# Patient Record
Sex: FEMALE | Race: BLACK OR AFRICAN AMERICAN | URBAN - METROPOLITAN AREA
[De-identification: names, ages, dates, MRNs, and addresses within clinical notes are randomized per-mention and may not be internally consistent; named-entity substitution may affect disease eponyms.]

---

## 2017-10-03 ENCOUNTER — OFFICE VISIT (OUTPATIENT)
Dept: CARDIOLOGY CLINIC | Age: 82
End: 2017-10-03

## 2017-10-03 VITALS
HEIGHT: 65 IN | SYSTOLIC BLOOD PRESSURE: 142 MMHG | BODY MASS INDEX: 36.32 KG/M2 | WEIGHT: 218 LBS | DIASTOLIC BLOOD PRESSURE: 57 MMHG | HEART RATE: 51 BPM

## 2017-10-03 DIAGNOSIS — E78.00 PURE HYPERCHOLESTEROLEMIA: ICD-10-CM

## 2017-10-03 DIAGNOSIS — I48.0 PAROXYSMAL ATRIAL FIBRILLATION (HCC): ICD-10-CM

## 2017-10-03 DIAGNOSIS — I77.810 AORTIC ECTASIA, THORACIC (HCC): ICD-10-CM

## 2017-10-03 DIAGNOSIS — I10 ESSENTIAL HYPERTENSION: Primary | ICD-10-CM

## 2017-10-03 NOTE — MR AVS SNAPSHOT
Visit Information Date & Time Provider Department Dept. Phone Encounter #  
 10/3/2017 10:15 AM Aminata Julian MD [facility] [phone] Follow-up Instructions Return in about 3 months (around 1/3/2018), or if symptoms worsen or fail to improve, for post test. Upcoming Health Maintenance Date Due DTaP/Tdap/Td series (1 - Tdap) 4/14/1957 ZOSTER VACCINE AGE 60> 2/14/1996 GLAUCOMA SCREENING Q2Y 4/14/2001 OSTEOPOROSIS SCREENING (DEXA) 4/14/2001 Pneumococcal 65+ Low/Medium Risk (1 of 2 - PCV13) 4/14/2001 MEDICARE YEARLY EXAM 4/14/2001 INFLUENZA AGE 9 TO ADULT 8/1/2017 Allergies as of 10/3/2017  Review Complete On: 10/3/2017 By: Aminata Julian MD  
 No Known Allergies Current Immunizations  Never Reviewed No immunizations on file. Not reviewed this visit You Were Diagnosed With   
  
 Codes Comments Essential hypertension    -  Primary ICD-10-CM: I10 
ICD-9-CM: 401.9 controlled 
get labs from PCP Paroxysmal atrial fibrillation (HCC)     ICD-10-CM: I48.0 ICD-9-CM: 427.31 Happened many yrs ago, approx 2008 
10/17; 3/16 SR for now On pradaxa Pure hypercholesterolemia     ICD-10-CM: E78.00 ICD-9-CM: 272.0 no meds Aortic ectasia, thoracic (HCC)     ICD-10-CM: F09.651 ICD-9-CM: 447.71  Asc Ao 4.1(1/16) Vitals BP Pulse Height(growth percentile) Weight(growth percentile) BMI OB Status 142/57 (!) 51 5' 5\" (1.651 m) 218 lb (98.9 kg) 36.28 kg/m2 Postmenopausal  
 Smoking Status Never Smoker Vitals History BMI and BSA Data Body Mass Index Body Surface Area  
 36.28 kg/m 2 2.13 m 2 Your Updated Medication List  
  
   
This list is accurate as of: 10/3/17 11:21 AM.  Always use your most recent med list.  
  
  
  
  
 dabigatran etexilate 150 mg capsule Commonly known as:  PRADAXA Take 150 mg by mouth two (2) times a day. donepezil 10 mg tablet Commonly known as:  ARICEPT Take 10 mg by mouth nightly. Two tablets by mouth every morning  
  
 levothyroxine 150 mcg tablet Commonly known as:  SYNTHROID Take 25 mcg by mouth Daily (before breakfast). memantine 5 mg tablet Commonly known as:  Honora Kim Take 10 mg by mouth two (2) times a day. Take one every night for one week, then one twice a week  
  
 metoprolol tartrate 50 mg tablet Commonly known as:  LOPRESSOR Take 50 mg by mouth daily. Vytorin 10/10 10-10 mg per tablet Generic drug:  ezetimibe-simvastatin Take 1 Tab by mouth every evening. We Performed the Following AMB POC EKG ROUTINE W/ 12 LEADS, INTER & REP [65021 CPT(R)] Follow-up Instructions Return in about 3 months (around 1/3/2018), or if symptoms worsen or fail to improve, for post test.  
  
To-Do List   
 Around 10/03/2017 Lab:  CBC W/O DIFF Around 10/03/2017 Lab:  HEPATIC FUNCTION PANEL Around 10/03/2017 Lab:  LIPID PANEL   
  
 10/03/2017 Lab:  METABOLIC PANEL, BASIC Around 10/06/2017 Cardiac Services:  2D ECHO COMPLETE ADULT (TTE) Patient Instructions Medications Discontinued During This Encounter Medication Reason  trospium (SANCTURA) 20 mg tablet Duplicate Order  trospium (SANCTURA XL) 60 mg capsule Not A Current Medication  TRIAMTERENE-HYDROCHLOROTHIAZIDE 37.5 MG-25 MG TAB Not A Current Medication Orders Placed This Encounter  CBC W/O DIFF Standing Status:   Future Standing Expiration Date:   10/4/2018  METABOLIC PANEL, BASIC Standing Status:   Future Standing Expiration Date:   10/4/2018  LIPID PANEL Standing Status:   Future Standing Expiration Date:   1/3/2018  
 HEPATIC FUNCTION PANEL Standing Status:   Future Standing Expiration Date:   1/3/2018  2D ECHO COMPLETE ADULT (TTE) Standing Status:   Future Standing Expiration Date:   4/1/2018 Order Specific Question:   Reason for Exam: Answer:   AO ectasia  AMB POC EKG ROUTINE W/ 12 LEADS, INTER & REP Order Specific Question:   Reason for Exam: Answer:   hypertension Atrial Fibrillation: Care Instructions Your Care Instructions Atrial fibrillation is an irregular and often fast heartbeat. Treating this condition is important for several reasons. It can cause blood clots, which can travel from your heart to your brain and cause a stroke. If you have a fast heartbeat, you may feel lightheaded, dizzy, and weak. An irregular heartbeat can also increase your risk for heart failure. Atrial fibrillation is often the result of another heart condition, such as high blood pressure or coronary artery disease. Making changes to improve your heart condition will help you stay healthy and active. Follow-up care is a key part of your treatment and safety. Be sure to make and go to all appointments, and call your doctor if you are having problems. It's also a good idea to know your test results and keep a list of the medicines you take. How can you care for yourself at home? Medicines · Take your medicines exactly as prescribed. Call your doctor if you think you are having a problem with your medicine. You will get more details on the specific medicines your doctor prescribes. · If your doctor has given you a blood thinner to prevent a stroke, be sure you get instructions about how to take your medicine safely. Blood thinners can cause serious bleeding problems. · Do not take any vitamins, over-the-counter drugs, or herbal products without talking to your doctor first. 
Lifestyle changes · Do not smoke. Smoking can increase your chance of a stroke and heart attack. If you need help quitting, talk to your doctor about stop-smoking programs and medicines. These can increase your chances of quitting for good. · Eat a heart-healthy diet. · Stay at a healthy weight. Lose weight if you need to. · Limit alcohol to 2 drinks a day for men and 1 drink a day for women. Too much alcohol can cause health problems. · Avoid colds and flu. Get a pneumococcal vaccine shot. If you have had one before, ask your doctor whether you need another dose. Get a flu shot every year. If you must be around people with colds or flu, wash your hands often. Activity · If your doctor recommends it, get more exercise. Walking is a good choice. Bit by bit, increase the amount you walk every day. Try for at least 30 minutes on most days of the week. You also may want to swim, bike, or do other activities. Your doctor may suggest that you join a cardiac rehabilitation program so that you can have help increasing your physical activity safely. · Start light exercise if your doctor says it is okay. Even a small amount will help you get stronger, have more energy, and manage stress. Walking is an easy way to get exercise. Start out by walking a little more than you did in the hospital. Gradually increase the amount you walk. · When you exercise, watch for signs that your heart is working too hard. You are pushing too hard if you cannot talk while you are exercising. If you become short of breath or dizzy or have chest pain, sit down and rest immediately. · Check your pulse regularly. Place two fingers on the artery at the palm side of your wrist, in line with your thumb. If your heartbeat seems uneven or fast, talk to your doctor. When should you call for help? Call 911 anytime you think you may need emergency care. For example, call if: 
· You have symptoms of a heart attack. These may include: ¨ Chest pain or pressure, or a strange feeling in the chest. 
¨ Sweating. ¨ Shortness of breath. ¨ Nausea or vomiting. ¨ Pain, pressure, or a strange feeling in the back, neck, jaw, or upper belly or in one or both shoulders or arms. ¨ Lightheadedness or sudden weakness. ¨ A fast or irregular heartbeat. After you call 911, the  may tell you to chew 1 adult-strength or 2 to 4 low-dose aspirin. Wait for an ambulance. Do not try to drive yourself. · You have symptoms of a stroke. These may include: 
¨ Sudden numbness, tingling, weakness, or loss of movement in your face, arm, or leg, especially on only one side of your body. ¨ Sudden vision changes. ¨ Sudden trouble speaking. ¨ Sudden confusion or trouble understanding simple statements. ¨ Sudden problems with walking or balance. ¨ A sudden, severe headache that is different from past headaches. · You passed out (lost consciousness). Call your doctor now or seek immediate medical care if: 
· You have new or increased shortness of breath. · You feel dizzy or lightheaded, or you feel like you may faint. · Your heart rate becomes irregular. · You can feel your heart flutter in your chest or skip heartbeats. Tell your doctor if these symptoms are new or worse. Watch closely for changes in your health, and be sure to contact your doctor if you have any problems. Where can you learn more? Go to http://cher-tejas.info/. Enter U020 in the search box to learn more about \"Atrial Fibrillation: Care Instructions. \" Current as of: September 21, 2016 Content Version: 11.3 © 4743-0274 Healthwise, Incorporated. Care instructions adapted under license by [employer] (which disclaims liability or warranty for this information). If you have questions about a medical condition or this instruction, always ask your healthcare professional. [place] [address] any warranty or liability for your use of this information. Requested labs from PCP. Introducing [facility]! [facility] introduces Home[employer] patient portal. Now you can access parts of your medical record, email your doctor's office, and request medication refills online.    
 
1. In your internet browser, go to [email or web address] 2. Click on the First Time User? Click Here link in the Sign In box. You will see the New Member Sign Up page. 3. Enter your [employer] Access Code exactly as it appears below. You will not need to use this code after youve completed the sign-up process. If you do not sign up before the expiration date, you must request a new code. · [employer] Access Code: [email or web address] Expires: 1/1/2018 10:11 AM 
 
4. Enter the last four digits of your Social Security Number (xxxx) and Date of Birth (mm/dd/yyyy) as indicated and click Submit. You will be taken to the next sign-up page. 5. Create a SmarTotst ID. This will be your [employer] login ID and cannot be changed, so think of one that is secure and easy to remember. 6. Create a [employer] password. You can change your password at any time. 7. Enter your Password Reset Question and Answer. This can be used at a later time if you forget your password. 8. Enter your e-mail address. You will receive e-mail notification when new information is available in [address] 9. Click Sign Up. You can now view and download portions of your medical record. 10. Click the Download Summary menu link to download a portable copy of your medical information. If you have questions, please visit the Frequently Asked Questions section of the [employer] website. Remember, [employer] is NOT to be used for urgent needs. For medical emergencies, dial 911. Now available from your iPhone and Android! Please provide this summary of care documentation to your next provider. Your primary care clinician is listed as Jakob Pacheco. If you have any questions after today's visit, please call [phone].

## 2017-10-03 NOTE — PATIENT INSTRUCTIONS
Medications Discontinued During This Encounter   Medication Reason    trospium (SANCTURA) 20 mg tablet Duplicate Order    trospium (SANCTURA XL) 60 mg capsule Not A Current Medication    TRIAMTERENE-HYDROCHLOROTHIAZIDE 37.5 MG-25 MG TAB Not A Current Medication       Orders Placed This Encounter    CBC W/O DIFF     Standing Status:   Future     Standing Expiration Date:   06/4/7279    METABOLIC PANEL, BASIC     Standing Status:   Future     Standing Expiration Date:   10/4/2018    LIPID PANEL     Standing Status:   Future     Standing Expiration Date:   1/3/2018    HEPATIC FUNCTION PANEL     Standing Status:   Future     Standing Expiration Date:   1/3/2018    2D ECHO COMPLETE ADULT (TTE)     Standing Status:   Future     Standing Expiration Date:   4/1/2018     Order Specific Question:   Reason for Exam:     Answer:   AO ectasia    AMB POC EKG ROUTINE W/ 12 LEADS, INTER & REP     Order Specific Question:   Reason for Exam:     Answer:   hypertension          Atrial Fibrillation: Care Instructions  Your Care Instructions    Atrial fibrillation is an irregular and often fast heartbeat. Treating this condition is important for several reasons. It can cause blood clots, which can travel from your heart to your brain and cause a stroke. If you have a fast heartbeat, you may feel lightheaded, dizzy, and weak. An irregular heartbeat can also increase your risk for heart failure. Atrial fibrillation is often the result of another heart condition, such as high blood pressure or coronary artery disease. Making changes to improve your heart condition will help you stay healthy and active. Follow-up care is a key part of your treatment and safety. Be sure to make and go to all appointments, and call your doctor if you are having problems. It's also a good idea to know your test results and keep a list of the medicines you take. How can you care for yourself at home?   Medicines  · Take your medicines exactly as prescribed. Call your doctor if you think you are having a problem with your medicine. You will get more details on the specific medicines your doctor prescribes. · If your doctor has given you a blood thinner to prevent a stroke, be sure you get instructions about how to take your medicine safely. Blood thinners can cause serious bleeding problems. · Do not take any vitamins, over-the-counter drugs, or herbal products without talking to your doctor first.  Lifestyle changes  · Do not smoke. Smoking can increase your chance of a stroke and heart attack. If you need help quitting, talk to your doctor about stop-smoking programs and medicines. These can increase your chances of quitting for good. · Eat a heart-healthy diet. · Stay at a healthy weight. Lose weight if you need to. · Limit alcohol to 2 drinks a day for men and 1 drink a day for women. Too much alcohol can cause health problems. · Avoid colds and flu. Get a pneumococcal vaccine shot. If you have had one before, ask your doctor whether you need another dose. Get a flu shot every year. If you must be around people with colds or flu, wash your hands often. Activity  · If your doctor recommends it, get more exercise. Walking is a good choice. Bit by bit, increase the amount you walk every day. Try for at least 30 minutes on most days of the week. You also may want to swim, bike, or do other activities. Your doctor may suggest that you join a cardiac rehabilitation program so that you can have help increasing your physical activity safely. · Start light exercise if your doctor says it is okay. Even a small amount will help you get stronger, have more energy, and manage stress. Walking is an easy way to get exercise. Start out by walking a little more than you did in the hospital. Gradually increase the amount you walk. · When you exercise, watch for signs that your heart is working too hard.  You are pushing too hard if you cannot talk while you are exercising. If you become short of breath or dizzy or have chest pain, sit down and rest immediately. · Check your pulse regularly. Place two fingers on the artery at the palm side of your wrist, in line with your thumb. If your heartbeat seems uneven or fast, talk to your doctor. When should you call for help? Call 911 anytime you think you may need emergency care. For example, call if:  · You have symptoms of a heart attack. These may include:  ¨ Chest pain or pressure, or a strange feeling in the chest.  ¨ Sweating. ¨ Shortness of breath. ¨ Nausea or vomiting. ¨ Pain, pressure, or a strange feeling in the back, neck, jaw, or upper belly or in one or both shoulders or arms. ¨ Lightheadedness or sudden weakness. ¨ A fast or irregular heartbeat. After you call 911, the  may tell you to chew 1 adult-strength or 2 to 4 low-dose aspirin. Wait for an ambulance. Do not try to drive yourself. · You have symptoms of a stroke. These may include:  ¨ Sudden numbness, tingling, weakness, or loss of movement in your face, arm, or leg, especially on only one side of your body. ¨ Sudden vision changes. ¨ Sudden trouble speaking. ¨ Sudden confusion or trouble understanding simple statements. ¨ Sudden problems with walking or balance. ¨ A sudden, severe headache that is different from past headaches. · You passed out (lost consciousness). Call your doctor now or seek immediate medical care if:  · You have new or increased shortness of breath. · You feel dizzy or lightheaded, or you feel like you may faint. · Your heart rate becomes irregular. · You can feel your heart flutter in your chest or skip heartbeats. Tell your doctor if these symptoms are new or worse. Watch closely for changes in your health, and be sure to contact your doctor if you have any problems. Where can you learn more? Go to http://cher-tejas.info/.   Enter U020 in the search box to learn more about \"Atrial Fibrillation: Care Instructions. \"  Current as of: September 21, 2016  Content Version: 11.3  © 5217-9636 [employer]. Care instructions adapted under license by [employer] (which disclaims liability or warranty for this information). If you have questions about a medical condition or this instruction, always ask your healthcare professional. [place] [address] any warranty or liability for your use of this information. Requested labs from PCP.

## 2017-10-03 NOTE — PROGRESS NOTES
HISTORY OF PRESENT ILLNESS  Jenny Kruse is a 81 y.o. female. HPI Comments:  f/u h/o AFib, CHF      Cholesterol Problem   The history is provided by the medical records. This is a chronic problem. Pertinent negatives include no chest pain, no headaches and no shortness of breath. CHF   The history is provided by the medical records. This is a chronic problem. Pertinent negatives include no chest pain, no headaches and no shortness of breath. Hypertension   The history is provided by the medical records. This is a chronic problem. Pertinent negatives include no chest pain, no headaches and no shortness of breath. Palpitations    The history is provided by the medical records and spouse. The problem has been resolved. Pertinent negatives include no fever, no malaise/fatigue, no chest pain, no claudication, no orthopnea, no PND, no nausea, no vomiting, no headaches, no dizziness, no cough and no shortness of breath. Her past medical history is significant for hypertension. Review of Systems   Constitutional: Negative for chills, fever, malaise/fatigue and weight loss. HENT: Negative for nosebleeds. Eyes: Negative for discharge. Respiratory: Negative for cough, shortness of breath and wheezing. Cardiovascular: Positive for palpitations (Paf). Negative for chest pain, orthopnea, claudication, leg swelling and PND. Gastrointestinal: Negative for diarrhea, nausea and vomiting. Genitourinary: Negative for dysuria and hematuria. Musculoskeletal: Negative for joint pain. Skin: Negative for rash. Neurological: Negative for dizziness, seizures, loss of consciousness and headaches. Endo/Heme/Allergies: Negative for polydipsia. Does not bruise/bleed easily. Psychiatric/Behavioral: Negative for depression and substance abuse. The patient does not have insomnia.       No Known Allergies    Past Medical History:   Diagnosis Date    Dementia 12/31/2015    Essential hypertension 12/31/2015    HLD (hyperlipidemia) 12/31/2015    Old cardioembolic stroke without late effect 12/31/2015    Paroxysmal atrial fibrillation ([facility] [address] 12/31/2015       Family History   Problem Relation Age of Onset    Hypertension Mother     Hypertension Sister     Heart Attack Neg Hx     Stroke Neg Hx        Social History   Substance Use Topics    Smoking status: Never Smoker    Smokeless tobacco: Never Used    Alcohol use No        Current Outpatient Prescriptions   Medication Sig    ezetimibe-simvastatin (VYTORIN 10/10) 10-10 mg per tablet Take 1 Tab by mouth every evening.  donepezil (ARICEPT) 10 mg tablet Take 10 mg by mouth nightly. Two tablets by mouth every morning    memantine (NAMENDA) 5 mg tablet Take 10 mg by mouth two (2) times a day. Take one every night for one week, then one twice a week     dabigatran etexilate (PRADAXA) 150 mg capsule Take 150 mg by mouth two (2) times a day.  levothyroxine (SYNTHROID) 150 mcg tablet Take 25 mcg by mouth Daily (before breakfast).  metoprolol (LOPRESSOR) 50 mg tablet Take 50 mg by mouth daily. No current facility-administered medications for this visit. History reviewed. No pertinent surgical history. Diagnostic Studies: Old records reviewed and show as follows  EKG tracings reviewed by me today. CARDIOLOGY STUDIES 1/5/2016   Echocardiogram - Complete Result 70%EF, mild LVH/DD/AI, tr MR, PAP41, Ao root37, Asc Ao41       Visit Vitals    /57    Pulse (!) 51    Ht 5' 5\" (1.651 m)    Wt 98.9 kg (218 lb)    BMI 36.28 kg/m2       Ms. Porras Hiss has a reminder for a \"due or due soon\" health maintenance. I have asked that she contact her primary care provider for follow-up on this health maintenance. Physical Exam   Constitutional: She is oriented to person, place, and time. She appears well-developed and well-nourished. No distress. obese   HENT:   Head: Normocephalic and atraumatic. Mouth/Throat: Normal dentition.    Eyes: Right eye exhibits no discharge. Left eye exhibits no discharge. No scleral icterus. Neck: Neck supple. No JVD present. Carotid bruit is not present. No thyromegaly present. Cardiovascular: Normal rate, regular rhythm, S1 normal, S2 normal, normal heart sounds and intact distal pulses. Exam reveals no gallop and no friction rub. No murmur heard. Pulmonary/Chest: Effort normal and breath sounds normal. She has no wheezes. She has no rales. Abdominal: Soft. She exhibits no mass. There is no tenderness. Musculoskeletal: She exhibits edema (trace). Lymphadenopathy:        Right cervical: No superficial cervical adenopathy present. Left cervical: No superficial cervical adenopathy present. Neurological: She is alert and oriented to person, place, and time. Skin: Skin is warm and dry. No rash noted. Psychiatric: She has a normal mood and affect. Her behavior is normal.       ASSESSMENT and PLAN          Diagnoses and all orders for this visit:    1. Essential hypertension  Comments:  controlled  get labs from PCP  Orders:  -     AMB POC EKG ROUTINE W/ 12 LEADS, INTER & REP  -     LIPID PANEL; Future  -     HEPATIC FUNCTION PANEL; Future    2. Paroxysmal atrial fibrillation ([address]  Comments:  Happened many yrs ago, approx 2008  10/17; 3/16 SR for now  On pradaxa  Orders:  -     2D ECHO COMPLETE ADULT (TTE); Future  -     CBC W/O DIFF; Future  -     METABOLIC PANEL, BASIC; Future    3. Pure hypercholesterolemia  Comments:  no meds  Orders:  -     LIPID PANEL; Future  -     HEPATIC FUNCTION PANEL; Future    4. Aortic ectasia, thoracic (HCC)  Comments:   Asc Ao 4.1(1/16)  Orders:  -     2D ECHO COMPLETE ADULT (TTE); Future        Pertinent laboratory and test data reviewed and discussed with patient.   See patient instructions also for other medical advice given    Medications Discontinued During This Encounter   Medication Reason    trospium (SANCTURA) 20 mg tablet Duplicate Order    trospium (SANCTURA XL) 60 mg capsule Not A Current Medication    TRIAMTERENE-HYDROCHLOROTHIAZIDE 37.5 MG-25 MG TAB Not A Current Medication       Follow-up Disposition:  Return in about 3 months (around 1/3/2018), or if symptoms worsen or fail to improve, for post test.

## 2017-10-03 NOTE — PROGRESS NOTES
1. Have you been to the ER, urgent care clinic since your last visit? Hospitalized since your last visit? No    2. Have you seen or consulted any other health care providers outside of the [address] [place] [address] since your last visit? Include any pap smears or colon screening. Yes Where: PCP     3. Since your last visit, have you had any of the following symptoms? .          4. Have you had any blood work, X-rays or cardiac testing? Yes Where: PCP             5.  Where do you normally have your labs drawn? PCP Office    6. Do you need any refills today?    No

## 2017-10-03 NOTE — LETTER
Louie Cordon 1935 
 
10/3/2017 Dear LUKE Osborne I had the pleasure of evaluating  Ms. Neeru Childs in office today. Below are the relevant portions of my assessment and plan of care. ICD-10-CM ICD-9-CM 1. Essential hypertension I10 401.9 AMB POC EKG ROUTINE W/ 12 LEADS, INTER & REP  
   LIPID PANEL  
   HEPATIC FUNCTION PANEL  
 controlled 
get labs from PCP 2. Paroxysmal atrial fibrillation (HCC) I48.0 427.31 2D ECHO COMPLETE ADULT (TTE) CBC W/O DIFF  
   METABOLIC PANEL, BASIC Happened many yrs ago, approx 2008 
10/17; 3/16 SR for now On pradaxa 3. Pure hypercholesterolemia E78.00 272.0 LIPID PANEL  
   HEPATIC FUNCTION PANEL  
 no meds 4. Aortic ectasia, thoracic (HCC) I77.810 447.71 2D ECHO COMPLETE ADULT (TTE) Asc Ao 4.1(1/16) Current Outpatient Prescriptions Medication Sig Dispense Refill  ezetimibe-simvastatin (VYTORIN 10/10) 10-10 mg per tablet Take 1 Tab by mouth every evening.  donepezil (ARICEPT) 10 mg tablet Take 10 mg by mouth nightly. Two tablets by mouth every morning  memantine (NAMENDA) 5 mg tablet Take 10 mg by mouth two (2) times a day. Take one every night for one week, then one twice a week  dabigatran etexilate (PRADAXA) 150 mg capsule Take 150 mg by mouth two (2) times a day.  levothyroxine (SYNTHROID) 150 mcg tablet Take 25 mcg by mouth Daily (before breakfast).  metoprolol (LOPRESSOR) 50 mg tablet Take 50 mg by mouth daily. Orders Placed This Encounter  CBC W/O DIFF Standing Status:   Future Standing Expiration Date:   10/4/2018  METABOLIC PANEL, BASIC Standing Status:   Future Standing Expiration Date:   10/4/2018  LIPID PANEL Standing Status:   Future Standing Expiration Date:   1/3/2018  
 HEPATIC FUNCTION PANEL Standing Status:   Future Standing Expiration Date:   1/3/2018  2D ECHO COMPLETE ADULT (TTE) Standing Status:   Future Standing Expiration Date:   4/1/2018 Order Specific Question:   Reason for Exam: Answer:   AO ectasia  AMB POC EKG ROUTINE W/ 12 LEADS, INTER & REP Order Specific Question:   Reason for Exam: Answer:   hypertension If you have questions, please do not hesitate to call me. I look forward to following Ms. Gunner Holliday along with you. Sincerely, Brigida Olivares MD

## 2017-10-06 ENCOUNTER — TELEPHONE (OUTPATIENT)
Dept: CARDIOLOGY CLINIC | Age: 82
End: 2017-10-06

## 2017-10-06 RX ORDER — LEVOTHYROXINE SODIUM 75 UG/1
75 TABLET ORAL
COMMUNITY

## 2017-10-06 NOTE — TELEPHONE ENCOUNTER
Patient called, spoke with . She was increased from synthroid 25mcg to 75 mcg yesterday by her PCP.  She does not have endocrinololgist.

## 2018-03-22 ENCOUNTER — OFFICE VISIT (OUTPATIENT)
Dept: CARDIOLOGY CLINIC | Age: 83
End: 2018-03-22

## 2018-03-22 VITALS
HEIGHT: 65 IN | BODY MASS INDEX: 34.16 KG/M2 | HEART RATE: 55 BPM | SYSTOLIC BLOOD PRESSURE: 137 MMHG | DIASTOLIC BLOOD PRESSURE: 50 MMHG | WEIGHT: 205 LBS

## 2018-03-22 DIAGNOSIS — E78.00 PURE HYPERCHOLESTEROLEMIA: ICD-10-CM

## 2018-03-22 DIAGNOSIS — I50.32 CHRONIC DIASTOLIC CONGESTIVE HEART FAILURE (HCC): Primary | ICD-10-CM

## 2018-03-22 DIAGNOSIS — F03.90 DEMENTIA WITHOUT BEHAVIORAL DISTURBANCE, UNSPECIFIED DEMENTIA TYPE: ICD-10-CM

## 2018-03-22 DIAGNOSIS — I77.810 AORTIC ECTASIA, THORACIC (HCC): ICD-10-CM

## 2018-03-22 DIAGNOSIS — I10 ESSENTIAL HYPERTENSION: ICD-10-CM

## 2018-03-22 RX ORDER — METOPROLOL TARTRATE 25 MG/1
25 TABLET, FILM COATED ORAL DAILY
Qty: 90 TAB | Refills: 1 | Status: SHIPPED | OUTPATIENT
Start: 2018-03-22

## 2018-03-22 RX ORDER — HYDROCHLOROTHIAZIDE 12.5 MG/1
12.5 TABLET ORAL EVERY OTHER DAY
Qty: 30 TAB | Refills: 3 | Status: SHIPPED | OUTPATIENT
Start: 2018-03-22 | End: 2018-11-06 | Stop reason: SDUPTHER

## 2018-03-22 NOTE — MR AVS SNAPSHOT
[address] [facility] 
 
 
 [address] [facility] 
[phone] Patient: Jessica Lynn MRN: [id number] Visit Information Date & Time Provider Department Dept. Phone Encounter #  
 3/22/2018 12:00 PM Bethel Dietz MD [facility] [place] [id number] Follow-up Instructions Return in about 3 months (around 6/22/2018), or if symptoms worsen or fail to improve, for post test, with ekg. Upcoming Health Maintenance Date Due DTaP/Tdap/Td series (1 - Tdap) 4/14/1957 ZOSTER VACCINE AGE 60> 2/14/1996 GLAUCOMA SCREENING Q2Y 4/14/2001 Bone Densitometry (Dexa) Screening 4/14/2001 Pneumococcal 65+ Low/Medium Risk (1 of 2 - PCV13) 4/14/2001 Influenza Age 5 to Adult 8/1/2017 MEDICARE YEARLY EXAM 3/14/2018 Allergies as of 3/22/2018  Review Complete On: 3/22/2018 By: Bethel Dietz MD  
 No Known Allergies Current Immunizations  Never Reviewed No immunizations on file. Not reviewed this visit You Were Diagnosed With   
  
 Codes Comments Chronic diastolic congestive heart failure (HCC)    -  Primary ICD-10-CM: I50.32 
ICD-9-CM: 428.32, 428.0 Essential hypertension     ICD-10-CM: I10 
ICD-9-CM: 401.9 3/18 reduce lopressor, add HCTZ EOD;   
 Pure hypercholesterolemia     ICD-10-CM: E78.00 ICD-9-CM: 272.0 3/18 on vytorin Aortic ectasia, thoracic (HCC)     ICD-10-CM: L71.744 ICD-9-CM: 447.71  Asc Ao 4.1(1/16) Dementia without behavioral disturbance, unspecified dementia type     ICD-10-CM: F03.90 ICD-9-CM: 294.20 Vitals BP Pulse Height(growth percentile) Weight(growth percentile) BMI OB Status 137/50 (!) 55 5' 5\" (1.651 m) 205 lb (93 kg) 34.11 kg/m2 Postmenopausal  
 Smoking Status Never Smoker Vitals History BMI and BSA Data Body Mass Index Body Surface Area  
 34.11 kg/m 2 2.07 m 2 Preferred Pharmacy Pharmacy Name Phone [facility], [facility] [phone] Your Updated Medication List  
  
   
This list is accurate as of 3/22/18 12:52 PM.  Always use your most recent med list.  
  
  
  
  
 dabigatran etexilate 150 mg capsule Commonly known as:  PRADAXA Take 150 mg by mouth two (2) times a day. donepezil 10 mg tablet Commonly known as:  ARICEPT Take 10 mg by mouth nightly. Two tablets by mouth every morning  
  
 hydroCHLOROthiazide 12.5 mg tablet Commonly known as:  HYDRODIURIL Take 1 Tab by mouth every other day. memantine 5 mg tablet Commonly known as:  Chyrl [place] Take 10 mg by mouth two (2) times a day. Take one every night for one week, then one twice a week  
  
 metoprolol tartrate 25 mg tablet Commonly known as:  LOPRESSOR Take 1 Tab by mouth daily. SYNTHROID 75 mcg tablet Generic drug:  levothyroxine Take 75 mcg by mouth Daily (before breakfast). Vytorin 10/10 10-10 mg per tablet Generic drug:  ezetimibe-simvastatin Take 1 Tab by mouth every evening. Prescriptions Sent to Pharmacy Refills  
 metoprolol tartrate (LOPRESSOR) 25 mg tablet 1 Sig: Take 1 Tab by mouth daily. Class: Normal  
 Pharmacy: [address], [address] Ph #: [phone] Route: Oral  
 hydroCHLOROthiazide (HYDRODIURIL) 12.5 mg tablet 3 Sig: Take 1 Tab by mouth every other day. Class: Normal  
 Pharmacy: [address], [address] Ph #: [phone] Route: Oral  
  
Follow-up Instructions Return in about 3 months (around 6/22/2018), or if symptoms worsen or fail to improve, for post test, with ekg. To-Do List   
 Around 03/25/2018 Cardiac Services:  2D ECHO COMPLETE ADULT (TTE) Around 04/01/2018 Lab:  HEPATIC FUNCTION PANEL Around 04/01/2018   Lab:  LIPID PANEL   
  
 Around 04/01/2018 Lab:  METABOLIC PANEL, BASIC Around 04/01/2018 Lab:  TSH 3RD GENERATION Patient Instructions Medications Discontinued During This Encounter Medication Reason  metoprolol (LOPRESSOR) 50 mg tablet Reorder Orders Placed This Encounter  METABOLIC PANEL, BASIC Standing Status:   Future Standing Expiration Date:   6/22/2018  LIPID PANEL Standing Status:   Future Standing Expiration Date:   6/22/2018  
 HEPATIC FUNCTION PANEL Standing Status:   Future Standing Expiration Date:   6/22/2018  TSH 3RD GENERATION Standing Status:   Future Standing Expiration Date:   6/22/2018  2D ECHO COMPLETE ADULT (TTE) Standing Status:   Future Standing Expiration Date:   9/18/2018 Order Specific Question:   Reason for Exam: Answer:   chf  
 metoprolol tartrate (LOPRESSOR) 25 mg tablet Sig: Take 1 Tab by mouth daily. Dispense:  90 Tab Refill:  1  
 hydroCHLOROthiazide (HYDRODIURIL) 12.5 mg tablet Sig: Take 1 Tab by mouth every other day. Dispense:  30 Tab Refill:  3 Avoiding Triggers With Heart Failure: Care Instructions Your Care Instructions Triggers are anything that make your heart failure flare up. A flare-up is also called \"sudden heart failure\" or \"acute heart failure. \" When you have a flare-up, fluid builds up in your lungs, and you have problems breathing. You might need to go to the hospital. By watching for changes in your condition and avoiding triggers, you can prevent heart failure flare-ups. Follow-up care is a key part of your treatment and safety. Be sure to make and go to all appointments, and call your doctor if you are having problems. It's also a good idea to know your test results and keep a list of the medicines you take. How can you care for yourself at home? Watch for changes in your weight and condition · Weigh yourself without clothing at the same time each day. Record your weight. Call your doctor if you have sudden weight gain, such as more than 2 to 3 pounds in a day or 5 pounds in a week. (Your doctor may suggest a different range of weight gain.) A sudden weight gain may mean that your heart failure is getting worse. · Keep a daily record of your symptoms. Write down any changes in how you feel, such as new shortness of breath, cough, or problems eating. Also record if your ankles are more swollen than usual and if you feel more tired than usual. Note anything that you ate or did that could have triggered these changes. Limit sodium Sodium causes your body to hold on to extra water. This may cause your heart failure symptoms to get worse. People get most of their sodium from processed foods. Fast food and restaurant meals also tend to be very high in sodium. · Your doctor may suggest that you limit sodium to 2,000 milligrams (mg) a day or less. That is less than 1 teaspoon of salt a day, including all the salt you eat in cooking or in packaged foods. · Read food labels on cans and food packages. They tell you how much sodium you get in one serving. Check the serving size. If you eat more than one serving, you are getting more sodium. · Be aware that sodium can come in forms other than salt, including monosodium glutamate (MSG), sodium citrate, and sodium bicarbonate (baking soda). MSG is often added to Asian food. You can sometimes ask for food without MSG or salt. · Slowly reducing salt will help you adjust to the taste. Take the salt shaker off the table. · Flavor your food with garlic, lemon juice, onion, vinegar, herbs, and spices instead of salt. Do not use soy sauce, steak sauce, onion salt, garlic salt, mustard, or ketchup on your food, unless it is labeled \"low-sodium\" or \"low-salt. \" 
· Make your own salad dressings, sauces, and ketchup without adding salt. · Use fresh or frozen ingredients, instead of canned ones, whenever you can. Choose low-sodium canned goods. · Eat less processed food and food from restaurants, including fast food. Exercise as directed Moderate, regular exercise is very good for your heart. It improves your blood flow and helps control your weight. But too much exercise can stress your heart and cause a heart failure flare-up. · Check with your doctor before you start an exercise program. 
· Walking is an easy way to get exercise. Start out slowly. Gradually increase the length and pace of your walk. Swimming, riding a bike, and using a treadmill are also good forms of exercise. · When you exercise, watch for signs that your heart is working too hard. You are pushing yourself too hard if you cannot talk while you are exercising. If you become short of breath or dizzy or have chest pain, stop, sit down, and rest. 
· Do not exercise when you do not feel well. Take medicines correctly · Take your medicines exactly as prescribed. Call your doctor if you think you are having a problem with your medicine. · Make a list of all the medicines you take. Include those prescribed to you by other doctors and any over-the-counter medicines, vitamins, or supplements you take. Take this list with you when you go to any doctor. · Take your medicines at the same time every day. It may help you to post a list of all the medicines you take every day and what time of day you take them. · Make taking your medicine as simple as you can. Plan times to take your medicines when you are doing other things, such as eating a meal or getting ready for bed. This will make it easier to remember to take your medicines. · Get organized. Use helpful tools, such as daily or weekly pill containers. When should you call for help? Call 911 if you have symptoms of sudden heart failure such as: 
? · You have severe trouble breathing. ? · You cough up pink, foamy mucus. ? · You have a new irregular or rapid heartbeat. ?Call your doctor now or seek immediate medical care if: 
? · You have new or increased shortness of breath. ? · You are dizzy or lightheaded, or you feel like you may faint. ? · You have sudden weight gain, such as more than 2 to 3 pounds in a day or 5 pounds in a week. (Your doctor may suggest a different range of weight gain.) ? · You have increased swelling in your legs, ankles, or feet. ? · You are suddenly so tired or weak that you cannot do your usual activities. ? Watch closely for changes in your health, and be sure to contact your doctor if you develop new symptoms. Where can you learn more? Go to http://cher-tejas.info/. Enter A927 in the search box to learn more about \"Avoiding Triggers With Heart Failure: Care Instructions. \" Current as of: September 21, 2016 Content Version: 11.4 © 0399-2138 [email or web address]. Care instructions adapted under license by [employer] (which disclaims liability or warranty for this information). If you have questions about a medical condition or this instruction, always ask your healthcare professional. [place] [address] any warranty or liability for your use of this information. Introducing [facility] [place] [facility]! [facility] introduces [employer] patient portal. Now you can access parts of your medical record, email your doctor's office, and request medication refills online. 1. In your internet browser, go to [email or web address] 2. Click on the First Time User? Click Here link in the Sign In box. You will see the New Member Sign Up page. 3. Enter your Fot[employer] Access Code exactly as it appears below. You will not need to use this code after youve completed the sign-up process. If you do not sign up before the expiration date, you must request a new code. · Fot[employer] Access Code: [email or web address] Expires: 6/20/2018 12:00 PM 
 4. Enter the last four digits of your Social Security Number (xxxx) and Date of Birth (mm/dd/yyyy) as indicated and click Submit. You will be taken to the next sign-up page. 5. Create a [employer] ID. This will be your [employer] login ID and cannot be changed, so think of one that is secure and easy to remember. 6. Create a [employer] password. You can change your password at any time. 7. Enter your Password Reset Question and Answer. This can be used at a later time if you forget your password. 8. Enter your e-mail address. You will receive e-mail notification when new information is available in [address] 9. Click Sign Up. You can now view and download portions of your medical record. 10. Click the Download Summary menu link to download a portable copy of your medical information. If you have questions, please visit the Frequently Asked Questions section of the [employer] website. Remember, [employer] is NOT to be used for urgent needs. For medical emergencies, dial 911. Now available from your iPhone and Android! Please provide this summary of care documentation to your next provider. Your primary care clinician is listed as Verónica Miles. If you have any questions after today's visit, please call [phone].

## 2018-03-22 NOTE — PROGRESS NOTES
1. Have you been to the ER, urgent care clinic since your last visit? Hospitalized since your last visit?     no    2. Have you seen or consulted any other health care providers outside of the [address] [place] [address] since your last visit? Include any pap smears or colon screening. Yes Where:      3. Since your last visit, have you had any of the following symptoms?   no     4. Have you had any blood work, X-rays or cardiac testing? Yes Where: Dr. Roz Lora         5. Where do you normally have your labs drawn?   [facility]  6. Do you need any refills today?    no

## 2018-03-22 NOTE — PROGRESS NOTES
HISTORY OF PRESENT ILLNESS  Rocco Oneal is a 81 y.o. female. HPI Comments:  f/u h/o AFib, CHF    3/18 pt denies SOB but per son, pt tired walking across our parking lot      Cholesterol Problem   The history is provided by the medical records. This is a chronic problem. Associated symptoms include shortness of breath. Pertinent negatives include no chest pain and no headaches. CHF   The history is provided by the medical records. This is a chronic problem. Associated symptoms include shortness of breath. Pertinent negatives include no chest pain and no headaches. Hypertension   The history is provided by the medical records. This is a chronic problem. Associated symptoms include shortness of breath. Pertinent negatives include no chest pain and no headaches. Review of Systems   Constitutional: Negative for chills, fever, malaise/fatigue and weight loss. HENT: Negative for nosebleeds. Eyes: Negative for discharge. Respiratory: Positive for shortness of breath. Negative for cough and wheezing. Cardiovascular: Positive for palpitations (Paf). Negative for chest pain, orthopnea, claudication, leg swelling and PND. Gastrointestinal: Negative for diarrhea, nausea and vomiting. Genitourinary: Negative for dysuria and hematuria. Musculoskeletal: Negative for joint pain. Skin: Negative for rash. Neurological: Negative for dizziness, seizures, loss of consciousness and headaches. Endo/Heme/Allergies: Negative for polydipsia. Does not bruise/bleed easily. Psychiatric/Behavioral: Negative for depression and substance abuse. The patient does not have insomnia.       No Known Allergies    Past Medical History:   Diagnosis Date    Dementia 12/31/2015    Essential hypertension 12/31/2015    HLD (hyperlipidemia) 12/31/2015    Old cardioembolic stroke without late effect 12/31/2015    Paroxysmal atrial fibrillation ([facility] [address] 12/31/2015       Family History   Problem Relation Age of Onset    Hypertension Mother     Hypertension Sister     Heart Attack Neg Hx     Stroke Neg Hx        Social History   Substance Use Topics    Smoking status: Never Smoker    Smokeless tobacco: Never Used    Alcohol use No        Current Outpatient Prescriptions   Medication Sig    levothyroxine (SYNTHROID) 75 mcg tablet Take 75 mcg by mouth Daily (before breakfast).  ezetimibe-simvastatin (VYTORIN 10/10) 10-10 mg per tablet Take 1 Tab by mouth every evening.  donepezil (ARICEPT) 10 mg tablet Take 10 mg by mouth nightly. Two tablets by mouth every morning    memantine (NAMENDA) 5 mg tablet Take 10 mg by mouth two (2) times a day. Take one every night for one week, then one twice a week     dabigatran etexilate (PRADAXA) 150 mg capsule Take 150 mg by mouth two (2) times a day.  metoprolol (LOPRESSOR) 50 mg tablet Take 50 mg by mouth daily. No current facility-administered medications for this visit. No past surgical history on file. Diagnostic Studies: Old records reviewed and show as follows  EKG tracings reviewed by me today. CARDIOLOGY STUDIES 1/5/2016   Echocardiogram - Complete Result 70%EF, mild LVH/DD/AI, tr MR, PAP41, Ao root37, Asc Ao41       Visit Vitals    /50    Pulse (!) 55    Ht 5' 5\" (1.651 m)    Wt 205 lb (93 kg)    BMI 34.11 kg/m2       Ms. Chloe Dill has a reminder for a \"due or due soon\" health maintenance. I have asked that she contact her primary care provider for follow-up on this health maintenance. Physical Exam   Constitutional: She is oriented to person, place, and time. She appears well-developed and well-nourished. No distress. obese   HENT:   Head: Normocephalic and atraumatic. Mouth/Throat: Normal dentition. Eyes: Right eye exhibits no discharge. Left eye exhibits no discharge. No scleral icterus. Neck: Neck supple. No JVD present. Carotid bruit is not present. No thyromegaly present.    Cardiovascular: Normal rate, regular rhythm, S1 normal, S2 normal, normal heart sounds and intact distal pulses. Exam reveals no gallop and no friction rub. No murmur heard. Pulmonary/Chest: Effort normal and breath sounds normal. She has no wheezes. She has no rales. Abdominal: Soft. She exhibits no mass. There is no tenderness. Musculoskeletal: She exhibits edema (trace). Lymphadenopathy:        Right cervical: No superficial cervical adenopathy present. Left cervical: No superficial cervical adenopathy present. Neurological: She is alert and oriented to person, place, and time. Skin: Skin is warm and dry. No rash noted. Psychiatric: She has a normal mood and affect. Her behavior is normal.       ASSESSMENT and PLAN          Diagnoses and all orders for this visit:    1. Chronic diastolic congestive heart failure (HCC)  -     metoprolol tartrate (LOPRESSOR) 25 mg tablet; Take 1 Tab by mouth daily. -     2D ECHO COMPLETE ADULT (TTE); Future  -     METABOLIC PANEL, BASIC; Future  -     HEPATIC FUNCTION PANEL; Future  -     TSH 3RD GENERATION; Future  -     hydroCHLOROthiazide (HYDRODIURIL) 12.5 mg tablet; Take 1 Tab by mouth every other day. 2. Essential hypertension  Comments:  3/18 reduce lopressor, add HCTZ EOD;     3. Pure hypercholesterolemia  Comments:  3/18 on vytorin    Orders:  -     LIPID PANEL; Future    4. Aortic ectasia, thoracic (HCC)  Comments:   Asc Ao 4.1(1/16)      5. Dementia without behavioral disturbance, unspecified dementia type        Pertinent laboratory and test data reviewed and discussed with patient. See patient instructions also for other medical advice given    Medications Discontinued During This Encounter   Medication Reason    metoprolol (LOPRESSOR) 50 mg tablet Reorder       Follow-up Disposition:  Return in about 3 months (around 6/22/2018), or if symptoms worsen or fail to improve, for post test, with ekg.

## 2018-03-22 NOTE — PATIENT INSTRUCTIONS
Medications Discontinued During This Encounter   Medication Reason    metoprolol (LOPRESSOR) 50 mg tablet Reorder       Orders Placed This Encounter    METABOLIC PANEL, BASIC     Standing Status:   Future     Standing Expiration Date:   6/22/2018    LIPID PANEL     Standing Status:   Future     Standing Expiration Date:   6/22/2018    HEPATIC FUNCTION PANEL     Standing Status:   Future     Standing Expiration Date:   6/22/2018    TSH 3RD GENERATION     Standing Status:   Future     Standing Expiration Date:   6/22/2018    2D ECHO COMPLETE ADULT (TTE)     Standing Status:   Future     Standing Expiration Date:   9/18/2018     Order Specific Question:   Reason for Exam:     Answer:   chf    metoprolol tartrate (LOPRESSOR) 25 mg tablet     Sig: Take 1 Tab by mouth daily. Dispense:  90 Tab     Refill:  1    hydroCHLOROthiazide (HYDRODIURIL) 12.5 mg tablet     Sig: Take 1 Tab by mouth every other day. Dispense:  30 Tab     Refill:  3          Avoiding Triggers With Heart Failure: Care Instructions  Your Care Instructions    Triggers are anything that make your heart failure flare up. A flare-up is also called \"sudden heart failure\" or \"acute heart failure. \" When you have a flare-up, fluid builds up in your lungs, and you have problems breathing. You might need to go to the hospital. By watching for changes in your condition and avoiding triggers, you can prevent heart failure flare-ups. Follow-up care is a key part of your treatment and safety. Be sure to make and go to all appointments, and call your doctor if you are having problems. It's also a good idea to know your test results and keep a list of the medicines you take. How can you care for yourself at home? Watch for changes in your weight and condition  · Weigh yourself without clothing at the same time each day. Record your weight. Call your doctor if you have sudden weight gain, such as more than 2 to 3 pounds in a day or 5 pounds in a week. (Your doctor may suggest a different range of weight gain.) A sudden weight gain may mean that your heart failure is getting worse. · Keep a daily record of your symptoms. Write down any changes in how you feel, such as new shortness of breath, cough, or problems eating. Also record if your ankles are more swollen than usual and if you feel more tired than usual. Note anything that you ate or did that could have triggered these changes. Limit sodium  Sodium causes your body to hold on to extra water. This may cause your heart failure symptoms to get worse. People get most of their sodium from processed foods. Fast food and restaurant meals also tend to be very high in sodium. · Your doctor may suggest that you limit sodium to 2,000 milligrams (mg) a day or less. That is less than 1 teaspoon of salt a day, including all the salt you eat in cooking or in packaged foods. · Read food labels on cans and food packages. They tell you how much sodium you get in one serving. Check the serving size. If you eat more than one serving, you are getting more sodium. · Be aware that sodium can come in forms other than salt, including monosodium glutamate (MSG), sodium citrate, and sodium bicarbonate (baking soda). MSG is often added to Asian food. You can sometimes ask for food without MSG or salt. · Slowly reducing salt will help you adjust to the taste. Take the salt shaker off the table. · Flavor your food with garlic, lemon juice, onion, vinegar, herbs, and spices instead of salt. Do not use soy sauce, steak sauce, onion salt, garlic salt, mustard, or ketchup on your food, unless it is labeled \"low-sodium\" or \"low-salt. \"  · Make your own salad dressings, sauces, and ketchup without adding salt. · Use fresh or frozen ingredients, instead of canned ones, whenever you can. Choose low-sodium canned goods. · Eat less processed food and food from restaurants, including fast food.   Exercise as directed  Moderate, regular exercise is very good for your heart. It improves your blood flow and helps control your weight. But too much exercise can stress your heart and cause a heart failure flare-up. · Check with your doctor before you start an exercise program.  · Walking is an easy way to get exercise. Start out slowly. Gradually increase the length and pace of your walk. Swimming, riding a bike, and using a treadmill are also good forms of exercise. · When you exercise, watch for signs that your heart is working too hard. You are pushing yourself too hard if you cannot talk while you are exercising. If you become short of breath or dizzy or have chest pain, stop, sit down, and rest.  · Do not exercise when you do not feel well. Take medicines correctly  · Take your medicines exactly as prescribed. Call your doctor if you think you are having a problem with your medicine. · Make a list of all the medicines you take. Include those prescribed to you by other doctors and any over-the-counter medicines, vitamins, or supplements you take. Take this list with you when you go to any doctor. · Take your medicines at the same time every day. It may help you to post a list of all the medicines you take every day and what time of day you take them. · Make taking your medicine as simple as you can. Plan times to take your medicines when you are doing other things, such as eating a meal or getting ready for bed. This will make it easier to remember to take your medicines. · Get organized. Use helpful tools, such as daily or weekly pill containers. When should you call for help? Call 911 if you have symptoms of sudden heart failure such as:  ? · You have severe trouble breathing. ? · You cough up pink, foamy mucus. ? · You have a new irregular or rapid heartbeat. ?Call your doctor now or seek immediate medical care if:  ? · You have new or increased shortness of breath.    ? · You are dizzy or lightheaded, or you feel like you may faint.   ? · You have sudden weight gain, such as more than 2 to 3 pounds in a day or 5 pounds in a week. (Your doctor may suggest a different range of weight gain.)   ? · You have increased swelling in your legs, ankles, or feet. ? · You are suddenly so tired or weak that you cannot do your usual activities. ? Watch closely for changes in your health, and be sure to contact your doctor if you develop new symptoms. Where can you learn more? Go to http://cher-tejas.info/. Enter F547 in the search box to learn more about \"Avoiding Triggers With Heart Failure: Care Instructions. \"  Current as of: September 21, 2016  Content Version: 11.4  © 2753-6204 [employer]. Care instructions adapted under license by [email or web address] (which disclaims liability or warranty for this information). If you have questions about a medical condition or this instruction, always ask your healthcare professional. [place] [address] any warranty or liability for your use of this information.

## 2018-03-22 NOTE — LETTER
[facility] 1935 
 
3/22/2018 Dear Gela Knox MD 
 
I had the pleasure of evaluating  Ms. Aye Rodriguez in office today. Below are the relevant portions of my assessment and plan of care. ICD-10-CM ICD-9-CM 1. Chronic diastolic congestive heart failure (HCC) I50.32 428.32 metoprolol tartrate (LOPRESSOR) 25 mg tablet 428.0 2D ECHO COMPLETE ADULT (TTE) METABOLIC PANEL, BASIC  
   HEPATIC FUNCTION PANEL  
   TSH 3RD GENERATION  
   hydroCHLOROthiazide (HYDRODIURIL) 12.5 mg tablet 2. Essential hypertension I10 401.9   
 3/18 reduce lopressor, add HCTZ EOD;   
3. Pure hypercholesterolemia E78.00 272.0 LIPID PANEL  
 3/18 on vytorin 4. Aortic ectasia, thoracic (HCC) I77.810 447.71 Asc Ao 4.1(1/16) 5. Dementia without behavioral disturbance, unspecified dementia type F03.90 294.20 Current Outpatient Prescriptions Medication Sig Dispense Refill  metoprolol tartrate (LOPRESSOR) 25 mg tablet Take 1 Tab by mouth daily. 90 Tab 1  
 hydroCHLOROthiazide (HYDRODIURIL) 12.5 mg tablet Take 1 Tab by mouth every other day. 30 Tab 3  
 levothyroxine (SYNTHROID) 75 mcg tablet Take 75 mcg by mouth Daily (before breakfast).  ezetimibe-simvastatin (VYTORIN 10/10) 10-10 mg per tablet Take 1 Tab by mouth every evening.  donepezil (ARICEPT) 10 mg tablet Take 10 mg by mouth nightly. Two tablets by mouth every morning  memantine (NAMENDA) 5 mg tablet Take 10 mg by mouth two (2) times a day. Take one every night for one week, then one twice a week  dabigatran etexilate (PRADAXA) 150 mg capsule Take 150 mg by mouth two (2) times a day. Orders Placed This Encounter  METABOLIC PANEL, BASIC Standing Status:   Future Standing Expiration Date:   6/22/2018  LIPID PANEL Standing Status:   Future Standing Expiration Date:   6/22/2018  
 HEPATIC FUNCTION PANEL Standing Status:   Future Standing Expiration Date:   6/22/2018  TSH 3RD GENERATION Standing Status:   Future Standing Expiration Date:   6/22/2018  2D ECHO COMPLETE ADULT (TTE) Standing Status:   Future Standing Expiration Date:   9/18/2018 Order Specific Question:   Reason for Exam: Answer:   chf  
 metoprolol tartrate (LOPRESSOR) 25 mg tablet Sig: Take 1 Tab by mouth daily. Dispense:  90 Tab Refill:  1  
 hydroCHLOROthiazide (HYDRODIURIL) 12.5 mg tablet Sig: Take 1 Tab by mouth every other day. Dispense:  30 Tab Refill:  3 If you have questions, please do not hesitate to call me. I look forward to following MsBrittany Adriana Flores along with you. Sincerely, Mackenzie Miner MD

## 2018-04-03 ENCOUNTER — CLINICAL SUPPORT (OUTPATIENT)
Dept: CARDIOLOGY CLINIC | Age: 83
End: 2018-04-03

## 2018-04-03 DIAGNOSIS — I50.32 CHRONIC DIASTOLIC CONGESTIVE HEART FAILURE (HCC): ICD-10-CM

## 2018-06-14 ENCOUNTER — OFFICE VISIT (OUTPATIENT)
Dept: CARDIOLOGY CLINIC | Age: 83
End: 2018-06-14

## 2018-06-14 VITALS
BODY MASS INDEX: 33.99 KG/M2 | WEIGHT: 204 LBS | SYSTOLIC BLOOD PRESSURE: 127 MMHG | HEIGHT: 65 IN | DIASTOLIC BLOOD PRESSURE: 49 MMHG | HEART RATE: 58 BPM

## 2018-06-14 DIAGNOSIS — I77.810 AORTIC ECTASIA, THORACIC (HCC): ICD-10-CM

## 2018-06-14 DIAGNOSIS — F03.90 DEMENTIA WITHOUT BEHAVIORAL DISTURBANCE, UNSPECIFIED DEMENTIA TYPE: ICD-10-CM

## 2018-06-14 DIAGNOSIS — I50.32 CHRONIC DIASTOLIC CONGESTIVE HEART FAILURE (HCC): Primary | ICD-10-CM

## 2018-06-14 DIAGNOSIS — I48.0 PAROXYSMAL ATRIAL FIBRILLATION (HCC): ICD-10-CM

## 2018-06-14 DIAGNOSIS — E05.90 HYPERTHYROIDISM: ICD-10-CM

## 2018-06-14 DIAGNOSIS — E78.00 PURE HYPERCHOLESTEROLEMIA: ICD-10-CM

## 2018-06-14 DIAGNOSIS — I10 ESSENTIAL HYPERTENSION: ICD-10-CM

## 2018-06-14 RX ORDER — ZOLPIDEM TARTRATE 10 MG/1
TABLET ORAL
COMMUNITY

## 2018-06-14 NOTE — LETTER
[facility]bbie Rolle 1935 6/14/2018 Dear Rolfe Fleischer, MD 
 
I had the pleasure of evaluating  Ms. Zoila Lang in office today. Below are the relevant portions of my assessment and plan of care. ICD-10-CM ICD-9-CM 1. Chronic diastolic congestive heart failure (HCC) I50.32 428.32   
  428.0   
 6/18 resolved clinically 2. Pure hypercholesterolemia E78.00 272.0   
 5/18 LDL62; 3/18 on vytorin 3. Essential hypertension I10 401.9 6/18 controlled; 4. Paroxysmal atrial fibrillation (HCC) I48.0 427.31 Happened many yrs ago, approx 2008 
10/17; 3/16 SR for now On pradaxa 5. Dementia without behavioral disturbance, unspecified dementia type F03.90 294.20   
6. 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 Hyperthyroidism E05.90 242.90   
 6/18 dose adjusted by PCP Current Outpatient Prescriptions Medication Sig Dispense Refill  zolpidem (AMBIEN) 10 mg tablet Take  by mouth nightly as needed for Sleep.  metoprolol tartrate (LOPRESSOR) 25 mg tablet Take 1 Tab by mouth daily. 90 Tab 1  
 hydroCHLOROthiazide (HYDRODIURIL) 12.5 mg tablet Take 1 Tab by mouth every other day. 30 Tab 3  
 levothyroxine (SYNTHROID) 75 mcg tablet Take 75 mcg by mouth Daily (before breakfast).  ezetimibe-simvastatin (VYTORIN 10/10) 10-10 mg per tablet Take 1 Tab by mouth every evening.  donepezil (ARICEPT) 10 mg tablet Take 10 mg by mouth nightly. Two tablets by mouth every morning  memantine (NAMENDA) 5 mg tablet Take 10 mg by mouth two (2) times a day. Take one every night for one week, then one twice a week  dabigatran etexilate (PRADAXA) 150 mg capsule Take 150 mg by mouth two (2) times a day. Orders Placed This Encounter  zolpidem (AMBIEN) 10 mg tablet Sig: Take  by mouth nightly as needed for Sleep. If you have questions, please do not hesitate to call me. I look forward to following Ms. Zoila Lang along with you. Sincerely, Joselyn Mullins MD

## 2018-06-14 NOTE — PROGRESS NOTES
1. Have you been to the ER, urgent care clinic since your last visit? Hospitalized since your last visit?     no    2. Have you seen or consulted any other health care providers outside of the [facility] since your last visit? Include any pap smears or colon screening. Yes Where: pcp     3. Since your last visit, have you had any of the following symptoms? no    4. Have you had any blood work, X-rays or cardiac testing? No    5. Where do you normally have your labs drawn? 6. Do you need any refills today?    no

## 2018-06-14 NOTE — PATIENT INSTRUCTIONS
There are no discontinued medications. Avoiding Triggers With Heart Failure: Care Instructions  Your Care Instructions    Triggers are anything that make your heart failure flare up. A flare-up is also called \"sudden heart failure\" or \"acute heart failure. \" When you have a flare-up, fluid builds up in your lungs, and you have problems breathing. You might need to go to the hospital. By watching for changes in your condition and avoiding triggers, you can prevent heart failure flare-ups. Follow-up care is a key part of your treatment and safety. Be sure to make and go to all appointments, and call your doctor if you are having problems. It's also a good idea to know your test results and keep a list of the medicines you take. How can you care for yourself at home? Watch for changes in your weight and condition  · Weigh yourself without clothing at the same time each day. Record your weight. Call your doctor if you have sudden weight gain, such as more than 2 to 3 pounds in a day or 5 pounds in a week. (Your doctor may suggest a different range of weight gain.) A sudden weight gain may mean that your heart failure is getting worse. · Keep a daily record of your symptoms. Write down any changes in how you feel, such as new shortness of breath, cough, or problems eating. Also record if your ankles are more swollen than usual and if you feel more tired than usual. Note anything that you ate or did that could have triggered these changes. Limit sodium  Sodium causes your body to hold on to extra water. This may cause your heart failure symptoms to get worse. People get most of their sodium from processed foods. Fast food and restaurant meals also tend to be very high in sodium. · Your doctor may suggest that you limit sodium to 2,000 milligrams (mg) a day or less. That is less than 1 teaspoon of salt a day, including all the salt you eat in cooking or in packaged foods.   · Read food labels on cans and food packages. They tell you how much sodium you get in one serving. Check the serving size. If you eat more than one serving, you are getting more sodium. · Be aware that sodium can come in forms other than salt, including monosodium glutamate (MSG), sodium citrate, and sodium bicarbonate (baking soda). MSG is often added to Asian food. You can sometimes ask for food without MSG or salt. · Slowly reducing salt will help you adjust to the taste. Take the salt shaker off the table. · Flavor your food with garlic, lemon juice, onion, vinegar, herbs, and spices instead of salt. Do not use soy sauce, steak sauce, onion salt, garlic salt, mustard, or ketchup on your food, unless it is labeled \"low-sodium\" or \"low-salt. \"  · Make your own salad dressings, sauces, and ketchup without adding salt. · Use fresh or frozen ingredients, instead of canned ones, whenever you can. Choose low-sodium canned goods. · Eat less processed food and food from restaurants, including fast food. Exercise as directed  Moderate, regular exercise is very good for your heart. It improves your blood flow and helps control your weight. But too much exercise can stress your heart and cause a heart failure flare-up. · Check with your doctor before you start an exercise program.  · Walking is an easy way to get exercise. Start out slowly. Gradually increase the length and pace of your walk. Swimming, riding a bike, and using a treadmill are also good forms of exercise. · When you exercise, watch for signs that your heart is working too hard. You are pushing yourself too hard if you cannot talk while you are exercising. If you become short of breath or dizzy or have chest pain, stop, sit down, and rest.  · Do not exercise when you do not feel well. Take medicines correctly  · Take your medicines exactly as prescribed. Call your doctor if you think you are having a problem with your medicine. · Make a list of all the medicines you take.  Include those prescribed to you by other doctors and any over-the-counter medicines, vitamins, or supplements you take. Take this list with you when you go to any doctor. · Take your medicines at the same time every day. It may help you to post a list of all the medicines you take every day and what time of day you take them. · Make taking your medicine as simple as you can. Plan times to take your medicines when you are doing other things, such as eating a meal or getting ready for bed. This will make it easier to remember to take your medicines. · Get organized. Use helpful tools, such as daily or weekly pill containers. When should you call for help? Call 911 if you have symptoms of sudden heart failure such as:  ? · You have severe trouble breathing. ? · You cough up pink, foamy mucus. ? · You have a new irregular or rapid heartbeat. ?Call your doctor now or seek immediate medical care if:  ? · You have new or increased shortness of breath. ? · You are dizzy or lightheaded, or you feel like you may faint. ? · You have sudden weight gain, such as more than 2 to 3 pounds in a day or 5 pounds in a week. (Your doctor may suggest a different range of weight gain.)   ? · You have increased swelling in your legs, ankles, or feet. ? · You are suddenly so tired or weak that you cannot do your usual activities. ? Watch closely for changes in your health, and be sure to contact your doctor if you develop new symptoms. Where can you learn more? Go to http://cher-tejas.info/. Enter A248 in the search box to learn more about \"Avoiding Triggers With Heart Failure: Care Instructions. \"  Current as of: September 21, 2016  Content Version: 11.4  © 8533-5174 [employer]. Care instructions adapted under license by [employer] (which disclaims liability or warranty for this information).  If you have questions about a medical condition or this instruction, always ask your healthcare professional. [address] any warranty or liability for your use of this information.

## 2018-06-14 NOTE — MR AVS SNAPSHOT
[address] [place] [address] 
 
 
 [address] [facility] 
[phone] Patient: Keke Farrell MRN: [id number] [id number] Visit Information Date & Time Provider Department Dept. Phone Encounter #  
 6/14/2018  1:00 PM Ronit Huffman MD [facility] [phone] [id number] Follow-up Instructions Return if symptoms worsen or fail to improve. Upcoming Health Maintenance Date Due DTaP/Tdap/Td series (1 - Tdap) 4/14/1957 ZOSTER VACCINE AGE 60> 2/14/1996 GLAUCOMA SCREENING Q2Y 4/14/2001 Bone Densitometry (Dexa) Screening 4/14/2001 Pneumococcal 65+ Low/Medium Risk (1 of 2 - PCV13) 4/14/2001 MEDICARE YEARLY EXAM 3/14/2018 Influenza Age 5 to Adult 8/1/2018 Allergies as of 6/14/2018  Review Complete On: 6/14/2018 By: Ronit Huffman MD  
 No Known Allergies Current Immunizations  Never Reviewed No immunizations on file. Not reviewed this visit You Were Diagnosed With   
  
 Codes Comments Chronic diastolic congestive heart failure ([facility] [address]    -  Primary ICD-10-CM: I50.32 
ICD-9-CM: 428.32, 428.0 6/18 resolved clinically Pure hypercholesterolemia     ICD-10-CM: E78.00 ICD-9-CM: 272.0 5/18 LDL62; 3/18 on vytorin Essential hypertension     ICD-10-CM: I10 
ICD-9-CM: 401.9 6/18 controlled;   
 Paroxysmal atrial fibrillation (HCC)     ICD-10-CM: I48.0 ICD-9-CM: 427.31 Happened many yrs ago, approx 2008 
10/17; 3/16 SR for now On pradaxa Dementia without behavioral disturbance, unspecified dementia type     ICD-10-CM: F03.90 ICD-9-CM: 294.20 Aortic ectasia, thoracic (HCC)     ICD-10-CM: U53.505 ICD-9-CM: 447.71  Asc Ao 4.0(4/18); 4.1(1/16) Hyperthyroidism     ICD-10-CM: E05.90 ICD-9-CM: 242.90 6/18 dose adjusted by PCP Vitals BP Pulse Height(growth percentile) Weight(growth percentile) BMI OB Status 127/49 (!) 58 5' 5\" (1.651 m) 204 lb (92.5 kg) 33.95 kg/m2 Postmenopausal  
 Smoking Status Never Smoker Vitals History BMI and BSA Data Body Mass Index Body Surface Area 33.95 kg/m 2 2.06 m 2 Preferred Pharmacy Pharmacy Name Phone [address] [facility] [phone] Your Updated Medication List  
  
   
This list is accurate as of 6/14/18  2:11 PM.  Always use your most recent med list.  
  
  
  
  
 AMBIEN 10 mg tablet Generic drug:  zolpidem Take  by mouth nightly as needed for Sleep.  
  
 dabigatran etexilate 150 mg capsule Commonly known as:  PRADAXA Take 150 mg by mouth two (2) times a day. donepezil 10 mg tablet Commonly known as:  ARICEPT Take 10 mg by mouth nightly. Two tablets by mouth every morning  
  
 hydroCHLOROthiazide 12.5 mg tablet Commonly known as:  HYDRODIURIL Take 1 Tab by mouth every other day. memantine 5 mg tablet Commonly known as:  Maebelle Plume Take 10 mg by mouth two (2) times a day. Take one every night for one week, then one twice a week  
  
 metoprolol tartrate 25 mg tablet Commonly known as:  LOPRESSOR Take 1 Tab by mouth daily. SYNTHROID 75 mcg tablet Generic drug:  levothyroxine Take 75 mcg by mouth Daily (before breakfast). Vytorin 10/10 10-10 mg per tablet Generic drug:  ezetimibe-simvastatin Take 1 Tab by mouth every evening. Follow-up Instructions Return if symptoms worsen or fail to improve. Patient Instructions There are no discontinued medications. Avoiding Triggers With Heart Failure: Care Instructions Your Care Instructions Triggers are anything that make your heart failure flare up. A flare-up is also called \"sudden heart failure\" or \"acute heart failure. \" When you have a flare-up, fluid builds up in your lungs, and you have problems breathing. You might need to go to the hospital. By watching for changes in your condition and avoiding triggers, you can prevent heart failure flare-ups. Follow-up care is a key part of your treatment and safety. Be sure to make and go to all appointments, and call your doctor if you are having problems. It's also a good idea to know your test results and keep a list of the medicines you take. How can you care for yourself at home? Watch for changes in your weight and condition · Weigh yourself without clothing at the same time each day. Record your weight. Call your doctor if you have sudden weight gain, such as more than 2 to 3 pounds in a day or 5 pounds in a week. (Your doctor may suggest a different range of weight gain.) A sudden weight gain may mean that your heart failure is getting worse. · Keep a daily record of your symptoms. Write down any changes in how you feel, such as new shortness of breath, cough, or problems eating. Also record if your ankles are more swollen than usual and if you feel more tired than usual. Note anything that you ate or did that could have triggered these changes. Limit sodium Sodium causes your body to hold on to extra water. This may cause your heart failure symptoms to get worse. People get most of their sodium from processed foods. Fast food and restaurant meals also tend to be very high in sodium. · Your doctor may suggest that you limit sodium to 2,000 milligrams (mg) a day or less. That is less than 1 teaspoon of salt a day, including all the salt you eat in cooking or in packaged foods. · Read food labels on cans and food packages. They tell you how much sodium you get in one serving. Check the serving size. If you eat more than one serving, you are getting more sodium.  
· Be aware that sodium can come in forms other than salt, including monosodium glutamate (MSG), sodium citrate, and sodium bicarbonate (baking soda). MSG is often added to Asian food. You can sometimes ask for food without MSG or salt. · Slowly reducing salt will help you adjust to the taste. Take the salt shaker off the table. · Flavor your food with garlic, lemon juice, onion, vinegar, herbs, and spices instead of salt. Do not use soy sauce, steak sauce, onion salt, garlic salt, mustard, or ketchup on your food, unless it is labeled \"low-sodium\" or \"low-salt. \" 
· Make your own salad dressings, sauces, and ketchup without adding salt. · Use fresh or frozen ingredients, instead of canned ones, whenever you can. Choose low-sodium canned goods. · Eat less processed food and food from restaurants, including fast food. Exercise as directed Moderate, regular exercise is very good for your heart. It improves your blood flow and helps control your weight. But too much exercise can stress your heart and cause a heart failure flare-up. · Check with your doctor before you start an exercise program. 
· Walking is an easy way to get exercise. Start out slowly. Gradually increase the length and pace of your walk. Swimming, riding a bike, and using a treadmill are also good forms of exercise. · When you exercise, watch for signs that your heart is working too hard. You are pushing yourself too hard if you cannot talk while you are exercising. If you become short of breath or dizzy or have chest pain, stop, sit down, and rest. 
· Do not exercise when you do not feel well. Take medicines correctly · Take your medicines exactly as prescribed. Call your doctor if you think you are having a problem with your medicine. · Make a list of all the medicines you take. Include those prescribed to you by other doctors and any over-the-counter medicines, vitamins, or supplements you take. Take this list with you when you go to any doctor. · Take your medicines at the same time every day.  It may help you to post a list of all the medicines you take every day and what time of day you take them. · Make taking your medicine as simple as you can. Plan times to take your medicines when you are doing other things, such as eating a meal or getting ready for bed. This will make it easier to remember to take your medicines. · Get organized. Use helpful tools, such as daily or weekly pill containers. When should you call for help? Call 911 if you have symptoms of sudden heart failure such as: 
? · You have severe trouble breathing. ? · You cough up pink, foamy mucus. ? · You have a new irregular or rapid heartbeat. ?Call your doctor now or seek immediate medical care if: 
? · You have new or increased shortness of breath. ? · You are dizzy or lightheaded, or you feel like you may faint. ? · You have sudden weight gain, such as more than 2 to 3 pounds in a day or 5 pounds in a week. (Your doctor may suggest a different range of weight gain.) ? · You have increased swelling in your legs, ankles, or feet. ? · You are suddenly so tired or weak that you cannot do your usual activities. ? Watch closely for changes in your health, and be sure to contact your doctor if you develop new symptoms. Where can you learn more? Go to http://cher-tejas.info/. Enter N097 in the search box to learn more about \"Avoiding Triggers With Heart Failure: Care Instructions. \" Current as of: September 21, 2016 Content Version: 11.4 © 7470-4818 [employer]. Care instructions adapted under license by [employer] (which disclaims liability or warranty for this information). If you have questions about a medical condition or this instruction, always ask your healthcare professional. [address] any warranty or liability for your use of this information. Introducing [facility]!    
 Shelbi Cherry introduces [employer] patient portal. Now you can access parts of your medical record, email your doctor's office, and request medication refills online. 1. In your internet browser, go to [email or web address] 2. Click on the First Time User? Click Here link in the Sign In box. You will see the New Member Sign Up page. 3. Enter your [employer] Access Code exactly as it appears below. You will not need to use this code after youve completed the sign-up process. If you do not sign up before the expiration date, you must request a new code. · [employer] Access Code: [email or web address] Expires: 6/20/2018 12:00 PM 
 
4. Enter the last four digits of your Social Security Number (xxxx) and Date of Birth (mm/dd/yyyy) as indicated and click Submit. You will be taken to the next sign-up page. 5. Create a [employer] ID. This will be your [employer] login ID and cannot be changed, so think of one that is secure and easy to remember. 6. Create a [employer] password. You can change your password at any time. 7. Enter your Password Reset Question and Answer. This can be used at a later time if you forget your password. 8. Enter your e-mail address. You will receive e-mail notification when new information is available in [address] 9. Click Sign Up. You can now view and download portions of your medical record. 10. Click the Download Summary menu link to download a portable copy of your medical information. If you have questions, please visit the Frequently Asked Questions section of the [employer] website. Remember, [employer] is NOT to be used for urgent needs. For medical emergencies, dial 911. Now available from your iPhone and Android! Please provide this summary of care documentation to your next provider. Your primary care clinician is listed as Fabby Brand. If you have any questions after today's visit, please call [phone].

## 2018-06-14 NOTE — PROGRESS NOTES
HISTORY OF PRESENT ILLNESS  Johnny Tidwell is a 81 y.o. female. HPI Comments:  f/u h/o AFib, CHF    6/18 some improvement in tiredness  3/18 pt denies SOB but per son, pt tired walking across our parking lot      Palpitations    The history is provided by the medical records (PAF). Associated symptoms include shortness of breath. Pertinent negatives include no fever, no malaise/fatigue, no chest pain, no claudication, no orthopnea, no PND, no nausea, no vomiting, no headaches, no dizziness and no cough. Her past medical history is significant for hypertension. Hypertension   The history is provided by the medical records. This is a chronic problem. Associated symptoms include shortness of breath. Pertinent negatives include no chest pain and no headaches. Cholesterol Problem   The history is provided by the medical records. This is a chronic problem. Associated symptoms include shortness of breath. Pertinent negatives include no chest pain and no headaches. CHF   The history is provided by the medical records. This is a chronic problem. Associated symptoms include shortness of breath. Pertinent negatives include no chest pain and no headaches. Review of Systems   Constitutional: Negative for chills, fever, malaise/fatigue and weight loss. HENT: Negative for nosebleeds. Eyes: Negative for discharge. Respiratory: Positive for shortness of breath. Negative for cough and wheezing. Cardiovascular: Positive for palpitations. Negative for chest pain, orthopnea, claudication, leg swelling and PND. Gastrointestinal: Negative for diarrhea, nausea and vomiting. Genitourinary: Negative for dysuria and hematuria. Musculoskeletal: Negative for joint pain. Skin: Negative for rash. Neurological: Negative for dizziness, seizures, loss of consciousness and headaches. Endo/Heme/Allergies: Negative for polydipsia. Does not bruise/bleed easily.    Psychiatric/Behavioral: Negative for depression and substance abuse. The patient does not have insomnia. No Known Allergies    Past Medical History:   Diagnosis Date    Dementia 12/31/2015    Essential hypertension 12/31/2015    HLD (hyperlipidemia) 12/31/2015    Old cardioembolic stroke without late effect 12/31/2015    Paroxysmal atrial fibrillation ([facility] [address] 12/31/2015       Family History   Problem Relation Age of Onset    Hypertension Mother     Hypertension Sister     Heart Attack Neg Hx     Stroke Neg Hx        Social History   Substance Use Topics    Smoking status: Never Smoker    Smokeless tobacco: Never Used    Alcohol use No        Current Outpatient Prescriptions   Medication Sig    zolpidem (AMBIEN) 10 mg tablet Take  by mouth nightly as needed for Sleep.  metoprolol tartrate (LOPRESSOR) 25 mg tablet Take 1 Tab by mouth daily.  hydroCHLOROthiazide (HYDRODIURIL) 12.5 mg tablet Take 1 Tab by mouth every other day.  levothyroxine (SYNTHROID) 75 mcg tablet Take 75 mcg by mouth Daily (before breakfast).  ezetimibe-simvastatin (VYTORIN 10/10) 10-10 mg per tablet Take 1 Tab by mouth every evening.  donepezil (ARICEPT) 10 mg tablet Take 10 mg by mouth nightly. Two tablets by mouth every morning    memantine (NAMENDA) 5 mg tablet Take 10 mg by mouth two (2) times a day. Take one every night for one week, then one twice a week     dabigatran etexilate (PRADAXA) 150 mg capsule Take 150 mg by mouth two (2) times a day. No current facility-administered medications for this visit. History reviewed. No pertinent surgical history. Diagnostic Studies: Old records reviewed and show as follows  EKG tracings reviewed by me today. CARDIOLOGY STUDIES 1/5/2016   Echocardiogram - Complete Result 70%EF, mild LVH/DD/AI, tr MR, PAP41, Ao root37, Asc Ao41   4/18 ECHO  SUMMARY:  Left ventricle: Systolic function was hyperdynamic. Ejection fraction was  estimated in the range of 70 % to 75 %.  There were no regional wall motion  abnormalities. There was moderate concentric hypertrophy. Doppler  parameters were consistent with abnormal left ventricular relaxation  (grade 1 diastolic dysfunction). Left atrium: The atrium was moderately dilated. Mitral valve: There was mild annular calcification. There was mild  regurgitation. Aortic valve: Transaortic velocity was increased due to increased  transvalvular flow. There was moderate regurgitation. Tricuspid valve: There was mild regurgitation. Pulmonic valve: There was mild regurgitation. Aorta, systemic arteries: There was mild dilatation of the ascending  aorta. The ascending aortic AP dimension was 40 mm. COMPARISONS:  Comparison was made with the previous study of 27-Apr-2016. LV thickness  has increased. LV overall function has increased and now appears  hyperdynamic. Aortic regurgitation has not changed. Mitral regurgitation  has not changed. Pulmonary artery pressure has not changed. Visit Vitals    /49    Pulse (!) 58    Ht 5' 5\" (1.651 m)    Wt 204 lb (92.5 kg)    BMI 33.95 kg/m2       Ms. Stfefi Bhagat has a reminder for a \"due or due soon\" health maintenance. I have asked that she contact her primary care provider for follow-up on this health maintenance. Physical Exam   Constitutional: She is oriented to person, place, and time. She appears well-developed and well-nourished. No distress. obese   HENT:   Head: Normocephalic and atraumatic. Mouth/Throat: Normal dentition. Eyes: Right eye exhibits no discharge. Left eye exhibits no discharge. No scleral icterus. Neck: Neck supple. No JVD present. Carotid bruit is not present. No thyromegaly present. Cardiovascular: Normal rate, regular rhythm, S1 normal, S2 normal, normal heart sounds and intact distal pulses. Exam reveals no gallop and no friction rub. No murmur heard. Pulmonary/Chest: Effort normal and breath sounds normal. She has no wheezes. She has no rales.    Abdominal: Soft. She exhibits no mass. There is no tenderness. Musculoskeletal: She exhibits edema (trace). Lymphadenopathy:        Right cervical: No superficial cervical adenopathy present. Left cervical: No superficial cervical adenopathy present. Neurological: She is alert and oriented to person, place, and time. Skin: Skin is warm and dry. No rash noted. Psychiatric: She has a normal mood and affect. Her behavior is normal.       ASSESSMENT and PLAN          Diagnoses and all orders for this visit:    1. Chronic diastolic congestive heart failure ([facility] [address]  Comments:  6/18 resolved clinically      2. Pure hypercholesterolemia  Comments:  5/18 LDL62; 3/18 on vytorin      3. Essential hypertension  Comments:  6/18 controlled;     4. Paroxysmal atrial fibrillation ([facility] [address]  Comments:  Happened many yrs ago, approx 2008  10/17; 3/16 SR for now  On pradaxa      5. Dementia without behavioral disturbance, unspecified dementia type    6. Aortic ectasia, thoracic (HCC)  Comments:   Asc Ao 4.0(4/18); 4.1(1/16)      7. Hyperthyroidism  Comments:  6/18 dose adjusted by PCP        Pertinent laboratory and test data reviewed and discussed with patient. See patient instructions also for other medical advice given    There are no discontinued medications. Follow-up Disposition:  Return if symptoms worsen or fail to improve.

## 2018-06-19 DIAGNOSIS — I50.32 CHRONIC DIASTOLIC CONGESTIVE HEART FAILURE (HCC): ICD-10-CM

## 2018-06-19 DIAGNOSIS — I48.0 PAROXYSMAL ATRIAL FIBRILLATION (HCC): ICD-10-CM

## 2018-06-19 DIAGNOSIS — E78.00 PURE HYPERCHOLESTEROLEMIA: ICD-10-CM

## 2019-09-30 DIAGNOSIS — I50.32 CHRONIC DIASTOLIC CONGESTIVE HEART FAILURE (HCC): ICD-10-CM

## 2019-09-30 RX ORDER — HYDROCHLOROTHIAZIDE 12.5 MG/1
TABLET ORAL
Qty: 7 TAB | Refills: 0 | OUTPATIENT
Start: 2019-09-30

## 2019-09-30 RX ORDER — HYDROCHLOROTHIAZIDE 12.5 MG/1
TABLET ORAL
Qty: 90 TAB | Refills: 1 | OUTPATIENT
Start: 2019-09-30

## 2019-09-30 NOTE — TELEPHONE ENCOUNTER
Spoke with patient .  Informed him that his daughter needed to call the office in reference to her refill request